# Patient Record
Sex: FEMALE | Race: WHITE | NOT HISPANIC OR LATINO
[De-identification: names, ages, dates, MRNs, and addresses within clinical notes are randomized per-mention and may not be internally consistent; named-entity substitution may affect disease eponyms.]

---

## 2018-12-17 ENCOUNTER — RESULT REVIEW (OUTPATIENT)
Age: 53
End: 2018-12-17

## 2019-02-15 PROBLEM — Z00.00 ENCOUNTER FOR PREVENTIVE HEALTH EXAMINATION: Status: ACTIVE | Noted: 2019-02-15

## 2019-02-20 ENCOUNTER — RESULT REVIEW (OUTPATIENT)
Age: 54
End: 2019-02-20

## 2019-02-20 ENCOUNTER — APPOINTMENT (OUTPATIENT)
Dept: BREAST CENTER | Facility: CLINIC | Age: 54
End: 2019-02-20
Payer: COMMERCIAL

## 2019-02-20 VITALS
RESPIRATION RATE: 18 BRPM | BODY MASS INDEX: 28.35 KG/M2 | WEIGHT: 160 LBS | DIASTOLIC BLOOD PRESSURE: 99 MMHG | HEART RATE: 110 BPM | HEIGHT: 63 IN | SYSTOLIC BLOOD PRESSURE: 164 MMHG | OXYGEN SATURATION: 96 %

## 2019-02-20 DIAGNOSIS — Z91.89 OTHER SPECIFIED PERSONAL RISK FACTORS, NOT ELSEWHERE CLASSIFIED: ICD-10-CM

## 2019-02-20 PROCEDURE — 99203 OFFICE O/P NEW LOW 30 MIN: CPT

## 2019-02-21 NOTE — PAST MEDICAL HISTORY
[Menarche Age ____] : age at menarche was [unfilled] [Menopause Age____] : age at menopause was [unfilled] [History of Hormone Replacement Treatment] : has no history of hormone replacement treatment [FreeTextEntry5] : s/p partial hysterectomy, has her ovaries in 2008 [FreeTextEntry7] : in her 20s [FreeTextEntry8] : n

## 2019-02-25 ENCOUNTER — RESULT REVIEW (OUTPATIENT)
Age: 54
End: 2019-02-25

## 2019-02-26 ENCOUNTER — APPOINTMENT (OUTPATIENT)
Dept: PLASTIC SURGERY | Facility: CLINIC | Age: 54
End: 2019-02-26
Payer: COMMERCIAL

## 2019-02-26 PROCEDURE — 99203 OFFICE O/P NEW LOW 30 MIN: CPT

## 2019-02-27 ENCOUNTER — RESULT REVIEW (OUTPATIENT)
Age: 54
End: 2019-02-27

## 2019-03-05 ENCOUNTER — APPOINTMENT (OUTPATIENT)
Dept: PLASTIC SURGERY | Facility: CLINIC | Age: 54
End: 2019-03-05
Payer: COMMERCIAL

## 2019-03-05 VITALS
WEIGHT: 160 LBS | OXYGEN SATURATION: 97 % | DIASTOLIC BLOOD PRESSURE: 87 MMHG | BODY MASS INDEX: 28.35 KG/M2 | HEIGHT: 63 IN | HEART RATE: 80 BPM | RESPIRATION RATE: 18 BRPM | SYSTOLIC BLOOD PRESSURE: 148 MMHG

## 2019-03-05 PROCEDURE — 99213 OFFICE O/P EST LOW 20 MIN: CPT

## 2019-03-05 NOTE — HISTORY OF PRESENT ILLNESS
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 Nature of the surgery, risks, benefits, alternatives, expected postoperative course, recovery, and long term results were reviewed.\par Patient is still deciding on oncologic plan with Dr. Toro.\par Will follow up with her once definitive surgical plan is in place to confirm the reconstructive plan.

## 2019-03-05 NOTE — HISTORY OF PRESENT ILLNESS
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 Nature of the surgery, risks, benefits, alternatives, expected postoperative course, recovery, and long term results were reviewed.\par All questions were answered. Will coordinate surgery date with Dr. Toro for b/l PETRA flap reconstruction.

## 2019-03-13 ENCOUNTER — RESULT REVIEW (OUTPATIENT)
Age: 54
End: 2019-03-13

## 2019-03-26 ENCOUNTER — APPOINTMENT (OUTPATIENT)
Dept: BREAST CENTER | Facility: HOSPITAL | Age: 54
End: 2019-03-26

## 2019-03-26 ENCOUNTER — RESULT REVIEW (OUTPATIENT)
Age: 54
End: 2019-03-26

## 2019-03-27 ENCOUNTER — RESULT CHARGE (OUTPATIENT)
Age: 54
End: 2019-03-27

## 2019-04-02 ENCOUNTER — APPOINTMENT (OUTPATIENT)
Dept: PLASTIC SURGERY | Facility: CLINIC | Age: 54
End: 2019-04-02
Payer: COMMERCIAL

## 2019-04-02 ENCOUNTER — APPOINTMENT (OUTPATIENT)
Dept: BREAST CENTER | Facility: CLINIC | Age: 54
End: 2019-04-02
Payer: COMMERCIAL

## 2019-04-02 PROCEDURE — 99024 POSTOP FOLLOW-UP VISIT: CPT

## 2019-04-03 NOTE — HISTORY OF PRESENT ILLNESS
[FreeTextEntry1] : s/p b/l mastectomy for right breast DCIS, and b/l breast reconstruction with PETRA flaps.\par Doing well, incisions intact, no collections, no infections.\par PETRA flap skin paddles healthy. Bilateral skin blistering on mastectomy skin around incisions.\par Both breast drains, and right abdominal drain removed today.\par \par Wound care with aquaphor to blistered areas reviewed.\par Activity limitations reviewed.\par Follow up next week to assess healing and remove remaining abdominal drain.

## 2019-04-05 ENCOUNTER — APPOINTMENT (OUTPATIENT)
Dept: BREAST CENTER | Facility: CLINIC | Age: 54
End: 2019-04-05
Payer: COMMERCIAL

## 2019-04-05 ENCOUNTER — APPOINTMENT (OUTPATIENT)
Dept: HEMATOLOGY ONCOLOGY | Facility: CLINIC | Age: 54
End: 2019-04-05
Payer: COMMERCIAL

## 2019-04-05 VITALS
HEART RATE: 95 BPM | WEIGHT: 166 LBS | DIASTOLIC BLOOD PRESSURE: 84 MMHG | SYSTOLIC BLOOD PRESSURE: 137 MMHG | OXYGEN SATURATION: 98 % | HEIGHT: 62.6 IN | RESPIRATION RATE: 16 BRPM | TEMPERATURE: 98.7 F | BODY MASS INDEX: 29.79 KG/M2

## 2019-04-05 DIAGNOSIS — Z86.79 PERSONAL HISTORY OF OTHER DISEASES OF THE CIRCULATORY SYSTEM: ICD-10-CM

## 2019-04-05 DIAGNOSIS — Z87.891 PERSONAL HISTORY OF NICOTINE DEPENDENCE: ICD-10-CM

## 2019-04-05 DIAGNOSIS — B99.9 UNSPECIFIED INFECTIOUS DISEASE: ICD-10-CM

## 2019-04-05 DIAGNOSIS — D05.11 INTRADUCTAL CARCINOMA IN SITU OF RIGHT BREAST: ICD-10-CM

## 2019-04-05 DIAGNOSIS — D36.9 BENIGN NEOPLASM, UNSPECIFIED SITE: ICD-10-CM

## 2019-04-05 PROCEDURE — 99245 OFF/OP CONSLTJ NEW/EST HI 55: CPT

## 2019-04-05 PROCEDURE — 99024 POSTOP FOLLOW-UP VISIT: CPT

## 2019-04-05 NOTE — CONSULT LETTER
[Dear  ___] : Dear  [unfilled], [Consult Letter:] : I had the pleasure of evaluating your patient, [unfilled]. [Please see my note below.] : Please see my note below. [Consult Closing:] : Thank you very much for allowing me to participate in the care of this patient.  If you have any questions, please do not hesitate to contact me. [Sincerely,] : Sincerely, [FreeTextEntry3] : Sourav Lovelace MD, MPH\par Attending Physician\par Hematology Oncology\par [facility] [place]\par [facility]\par

## 2019-04-05 NOTE — HISTORY OF PRESENT ILLNESS
[de-identified] : Ms Luna is a very pleasant 53 year old female seen in the office with a new diagnosed of DCIS\par \par She had a screening mammogram and ultrasound (12/17/2018) which showed right outer quadrant microcalcifications, indeterminate for DCIS. Bilateral sonographic masses consistent with benign complication cysts. \par She underwent right breast stereotactic biopsy on 02/08/2019 with path cw DCIS, ER 90%/[place] 95%. \par She has ho left breast excisional biopsy at [facility] 09/18/2013 which showed intraductal papilloma and radial sclerosing lesion\par \par OB GYN:\par Age at Menarche: 12 years\par Age at Menopause: Partial hysterectomy 2009. \par Age at first pregnancy: NA\par Total number of pregnancies: 0\par Breast feeding: NA\par OC pills: 15 years\par HRT: No\par \par Given findings on bilateral breasts. she underwent Bilateral simple mastectomy, bilateral sentinel lymph node axillary sampling, PETRA flap reconstruction (4/1/19) \par \par Pathology:\par A. Right retroareolar tissue:\par 	Benign breast tissue showing usual ductal hyperplasia, columnar cell change, \par 	   and adenosis.\par \par B. Right breast, mastectomy:\par 	DUCTAL CARCINOMA IN SITU (DCIS).\par 	Margins are uninvolved by DCIS.\par 	Extensive papillomatosis, involving central and peripheral ducts.\par . Right axillary sentinel lymph node:\par 	One lymph node, negative for metastatic carcinoma (0/1).\par 	Ranier node protocol was performed.\par \par D. Left sub-areolar tissue:\par 	Benign breast tissue.\par \par E. Left breast, mastectomy:\par Benign breast tissue showing atypical ductal hyperplasia, usual ductal \par    hyperplasia, multiple intraductal papillomas/papillomatosis-- some showing \par    sclerosis, columnar cell change, fibroadenomatoid nodule, cysts, and reactive \par    changes at prior biopsy sites.\par One benign lymph node in axillary tail (0/1).\par 	Negative for carcinoma.\par \par F. Left axillary sentinel lymph node:\par 	One lymph node, negative for metastatic carcinoma (0/1).\par 	Ranier node protocol was performed.\par \par G. Right internal mammary lymph node:\par 	One lymph node, negative for metastatic carcinoma (0/1).\par \par H. Left internal mammary lymph node:\par 	Adipose tissue, with minute fragment of hyaline cartilage.\par No lymph node tissue identified.\par Multiple levels in the block were examined.\par

## 2019-04-05 NOTE — ASSESSMENT
[FreeTextEntry1] : Right breast DCIS, ER/WV positive\par Right and Left breast papillomas\par \par Discussed about diagnosis, treatment options and prognosis \par Given that she had BL mastectomies, no indication for adjuvant endocrine therapy\par Advise to continue follow up with breast surgery\par \par Follow up PRN

## 2019-04-05 NOTE — REASON FOR VISIT
[Follow-Up: _____] : a [unfilled] follow-up visit [Spouse] : spouse [FreeTextEntry1] : Patient is coming in concerned about her PETRA flaps.

## 2019-04-10 ENCOUNTER — APPOINTMENT (OUTPATIENT)
Dept: PLASTIC SURGERY | Facility: CLINIC | Age: 54
End: 2019-04-10
Payer: COMMERCIAL

## 2019-04-10 PROCEDURE — 99024 POSTOP FOLLOW-UP VISIT: CPT

## 2019-04-10 NOTE — HISTORY OF PRESENT ILLNESS
[FreeTextEntry1] : s/p b/l mastectomy for right breast DCIS, and b/l breast reconstruction with PETRA flaps.\par Doing well, b/l area of skin necrosis measuring 2x4 cm adjavcent to breast incisions, no collections.\par Right lateral breast erythema, for which she is receiving keflex.\par Started on Bactrim today.\par PETRA flap skin paddles healthy. \par Remaining abdominal drain removed today.\par Activity limitations reviewed.\par Follow up in 1-2 weeks to assess healing.

## 2019-04-15 ENCOUNTER — APPOINTMENT (OUTPATIENT)
Dept: BREAST CENTER | Facility: CLINIC | Age: 54
End: 2019-04-15
Payer: COMMERCIAL

## 2019-04-15 PROCEDURE — 99024 POSTOP FOLLOW-UP VISIT: CPT

## 2019-04-15 RX ORDER — DIAZEPAM 5 MG/1
5 TABLET ORAL 3 TIMES DAILY
Qty: 20 | Refills: 0 | Status: DISCONTINUED | COMMUNITY
Start: 2019-03-27 | End: 2019-04-15

## 2019-04-15 RX ORDER — METOPROLOL SUCCINATE 25 MG/1
25 TABLET, EXTENDED RELEASE ORAL
Qty: 30 | Refills: 0 | Status: DISCONTINUED | COMMUNITY
Start: 2019-02-28 | End: 2019-04-11

## 2019-04-15 RX ORDER — CEPHALEXIN 500 MG/1
500 CAPSULE ORAL 3 TIMES DAILY
Qty: 21 | Refills: 0 | Status: COMPLETED | COMMUNITY
Start: 2019-04-05 | End: 2019-04-11

## 2019-04-15 RX ORDER — ONDANSETRON 4 MG/1
4 TABLET ORAL EVERY 6 HOURS
Qty: 20 | Refills: 0 | Status: DISCONTINUED | COMMUNITY
Start: 2019-03-27 | End: 2019-04-15

## 2019-04-15 RX ORDER — OXYCODONE AND ACETAMINOPHEN 5; 325 MG/1; MG/1
5-325 TABLET ORAL
Qty: 40 | Refills: 0 | Status: DISCONTINUED | COMMUNITY
Start: 2019-03-27 | End: 2019-04-15

## 2019-04-15 RX ORDER — SULFAMETHOXAZOLE AND TRIMETHOPRIM 800; 160 MG/1; MG/1
800-160 TABLET ORAL TWICE DAILY
Qty: 20 | Refills: 0 | Status: DISCONTINUED | COMMUNITY
Start: 2019-04-10 | End: 2019-04-11

## 2019-04-15 NOTE — REASON FOR VISIT
[Follow-Up: _____] : a [unfilled] follow-up visit [FreeTextEntry1] : right breast DCIS, s/p bilateral simple mastectomies, bilateral SLN sampling, PETRA flap

## 2019-04-16 ENCOUNTER — TRANSCRIPTION ENCOUNTER (OUTPATIENT)
Age: 54
End: 2019-04-16

## 2019-04-23 ENCOUNTER — APPOINTMENT (OUTPATIENT)
Dept: PLASTIC SURGERY | Facility: CLINIC | Age: 54
End: 2019-04-23
Payer: COMMERCIAL

## 2019-04-23 PROCEDURE — 99024 POSTOP FOLLOW-UP VISIT: CPT

## 2019-04-30 NOTE — HISTORY OF PRESENT ILLNESS
[FreeTextEntry1] : s/p b/l mastectomy for right breast DCIS, and b/l breast reconstruction with PETRA flaps.\par Doing well, b/l area of skin necrosis measuring 2x4 cm adjavcent to breast incisions, no collections.\par PETRA flap skin paddles healthy. \par Activity limitations reviewed.\par Follow up in 2 weeks to assess healing.

## 2019-05-07 ENCOUNTER — APPOINTMENT (OUTPATIENT)
Dept: PLASTIC SURGERY | Facility: CLINIC | Age: 54
End: 2019-05-07

## 2019-05-10 ENCOUNTER — APPOINTMENT (OUTPATIENT)
Dept: BREAST CENTER | Facility: CLINIC | Age: 54
End: 2019-05-10
Payer: COMMERCIAL

## 2019-05-10 PROCEDURE — 99024 POSTOP FOLLOW-UP VISIT: CPT

## 2019-05-21 ENCOUNTER — APPOINTMENT (OUTPATIENT)
Dept: PLASTIC SURGERY | Facility: CLINIC | Age: 54
End: 2019-05-21
Payer: COMMERCIAL

## 2019-05-21 PROCEDURE — 99024 POSTOP FOLLOW-UP VISIT: CPT

## 2019-05-21 NOTE — HISTORY OF PRESENT ILLNESS
[FreeTextEntry1] : s/p b/l mastectomy for right breast DCIS, and b/l breast reconstruction with PETRA flaps.\par Doing well, \par b/l breast eschar debrieded today 2x3 cm from each breast\par PETRA flap skin paddles healthy. \par \par Follow up in 6 weeks to assess readiness for second stage surgery

## 2019-07-03 ENCOUNTER — APPOINTMENT (OUTPATIENT)
Dept: PLASTIC SURGERY | Facility: CLINIC | Age: 54
End: 2019-07-03
Payer: COMMERCIAL

## 2019-07-03 VITALS
BODY MASS INDEX: 29.79 KG/M2 | RESPIRATION RATE: 18 BRPM | HEART RATE: 92 BPM | HEIGHT: 62.6 IN | SYSTOLIC BLOOD PRESSURE: 149 MMHG | DIASTOLIC BLOOD PRESSURE: 104 MMHG | WEIGHT: 166 LBS | OXYGEN SATURATION: 96 %

## 2019-07-03 PROCEDURE — 99213 OFFICE O/P EST LOW 20 MIN: CPT

## 2019-07-03 NOTE — HISTORY OF PRESENT ILLNESS
[FreeTextEntry1] : s/p b/l mastectomy for right breast DCIS, and b/l breast reconstruction with PETRA flaps.\par Well healed.\par Presented to discuss second stage surgery which will involve: b/l breast reconstruction revision, excision of PETRA flap skin paddles, breast fat grafting, and abdominal donor site revision.\par Nature of surgery, risks,benefits, alternatives, expected postoperative course, recovery and long term results reviewed.\par All questions answered.\par Will coordinate surgery for the near future.\par

## 2019-07-26 ENCOUNTER — RESULT REVIEW (OUTPATIENT)
Age: 54
End: 2019-07-26

## 2019-07-30 ENCOUNTER — RESULT REVIEW (OUTPATIENT)
Age: 54
End: 2019-07-30

## 2019-07-30 ENCOUNTER — RX RENEWAL (OUTPATIENT)
Age: 54
End: 2019-07-30

## 2019-07-30 RX ORDER — ONDANSETRON 4 MG/1
4 TABLET ORAL EVERY 6 HOURS
Qty: 20 | Refills: 0 | Status: ACTIVE | COMMUNITY
Start: 2019-07-30 | End: 1900-01-01

## 2019-07-30 RX ORDER — OXYCODONE AND ACETAMINOPHEN 5; 325 MG/1; MG/1
5-325 TABLET ORAL
Qty: 40 | Refills: 0 | Status: ACTIVE | COMMUNITY
Start: 2019-07-30 | End: 1900-01-01

## 2019-07-31 ENCOUNTER — CLINICAL ADVICE (OUTPATIENT)
Age: 54
End: 2019-07-31

## 2019-08-06 ENCOUNTER — APPOINTMENT (OUTPATIENT)
Dept: PLASTIC SURGERY | Facility: CLINIC | Age: 54
End: 2019-08-06
Payer: COMMERCIAL

## 2019-08-06 VITALS
HEIGHT: 62.6 IN | RESPIRATION RATE: 18 BRPM | SYSTOLIC BLOOD PRESSURE: 158 MMHG | WEIGHT: 166 LBS | DIASTOLIC BLOOD PRESSURE: 106 MMHG | HEART RATE: 93 BPM | OXYGEN SATURATION: 96 % | BODY MASS INDEX: 29.79 KG/M2

## 2019-08-06 PROCEDURE — 99024 POSTOP FOLLOW-UP VISIT: CPT

## 2019-08-08 NOTE — HISTORY OF PRESENT ILLNESS
[FreeTextEntry1] : s/p b/l breast reconstruction revision. Healing well, but developed allergic rash over torso.\par Given prescription for prednisone.\par Follow up in 1 week to reassess.

## 2019-08-13 ENCOUNTER — APPOINTMENT (OUTPATIENT)
Dept: PLASTIC SURGERY | Facility: CLINIC | Age: 54
End: 2019-08-13
Payer: COMMERCIAL

## 2019-08-13 VITALS
BODY MASS INDEX: 29.79 KG/M2 | WEIGHT: 166 LBS | OXYGEN SATURATION: 99 % | RESPIRATION RATE: 18 BRPM | HEIGHT: 62.6 IN | DIASTOLIC BLOOD PRESSURE: 88 MMHG | SYSTOLIC BLOOD PRESSURE: 147 MMHG | HEART RATE: 98 BPM

## 2019-08-13 PROCEDURE — 99024 POSTOP FOLLOW-UP VISIT: CPT

## 2019-08-13 RX ORDER — PREDNISONE 5 MG/1
5 TABLET ORAL DAILY
Qty: 4 | Refills: 0 | Status: COMPLETED | COMMUNITY
Start: 2019-08-06 | End: 2019-08-13

## 2019-08-13 NOTE — HISTORY OF PRESENT ILLNESS
[FreeTextEntry1] : s/p b/l breast reconstruction revision. Healing well, but developed allergic rash over torso.\par Rash now improved.\par Follwo up in 4 weeks or prn.

## 2019-10-15 ENCOUNTER — APPOINTMENT (OUTPATIENT)
Dept: PLASTIC SURGERY | Facility: CLINIC | Age: 54
End: 2019-10-15
Payer: COMMERCIAL

## 2019-10-15 VITALS
WEIGHT: 166 LBS | HEIGHT: 62.8 IN | HEART RATE: 86 BPM | OXYGEN SATURATION: 97 % | DIASTOLIC BLOOD PRESSURE: 88 MMHG | SYSTOLIC BLOOD PRESSURE: 135 MMHG | RESPIRATION RATE: 18 BRPM | BODY MASS INDEX: 29.41 KG/M2

## 2019-10-15 PROCEDURE — 99024 POSTOP FOLLOW-UP VISIT: CPT

## 2019-10-16 NOTE — HISTORY OF PRESENT ILLNESS
[FreeTextEntry1] : s/p b/l mastecomty, now with completed PETRA flap reconstruction.\par Well healed, good contour and symmetry.\par Reports sensation of tightness in breasts and abdomen, which she finds distressing.\par Recommend physical therapy for range of motion and desensitization therapy.\par Given referrals, follow up in 3-6 months to assess long term outcomes.

## 2020-02-03 ENCOUNTER — TRANSCRIPTION ENCOUNTER (OUTPATIENT)
Age: 55
End: 2020-02-03

## 2020-11-03 ENCOUNTER — APPOINTMENT (OUTPATIENT)
Dept: PLASTIC SURGERY | Facility: CLINIC | Age: 55
End: 2020-11-03
Payer: SELF-PAY

## 2020-11-03 VITALS
HEIGHT: 62.8 IN | BODY MASS INDEX: 29.41 KG/M2 | OXYGEN SATURATION: 98 % | SYSTOLIC BLOOD PRESSURE: 166 MMHG | WEIGHT: 166 LBS | DIASTOLIC BLOOD PRESSURE: 111 MMHG | HEART RATE: 93 BPM

## 2020-11-03 PROCEDURE — 99499 UNLISTED E&M SERVICE: CPT | Mod: NC

## 2020-11-09 ENCOUNTER — APPOINTMENT (OUTPATIENT)
Dept: BREAST CENTER | Facility: CLINIC | Age: 55
End: 2020-11-09

## 2020-11-09 NOTE — HISTORY OF PRESENT ILLNESS
[FreeTextEntry1] : s/p b/l mastecomty, now with completed PETRA flap reconstruction.\par Well healed, good contour and symmetry.\par No plan for intervention at this time.\par Moved to [place], will reestablish breast care there.

## 2024-02-08 ENCOUNTER — APPOINTMENT (OUTPATIENT)
Dept: OBGYN | Facility: CLINIC | Age: 59
End: 2024-02-08